# Patient Record
Sex: MALE | Race: WHITE | NOT HISPANIC OR LATINO | Employment: OTHER | URBAN - METROPOLITAN AREA
[De-identification: names, ages, dates, MRNs, and addresses within clinical notes are randomized per-mention and may not be internally consistent; named-entity substitution may affect disease eponyms.]

---

## 2017-04-27 ENCOUNTER — OFFICE VISIT (OUTPATIENT)
Dept: RADIATION ONCOLOGY | Facility: HOSPITAL | Age: 72
End: 2017-04-27

## 2017-04-27 ENCOUNTER — HOSPITAL ENCOUNTER (OUTPATIENT)
Dept: RADIATION ONCOLOGY | Facility: HOSPITAL | Age: 72
Setting detail: RADIATION/ONCOLOGY SERIES
Discharge: HOME | End: 2017-04-27

## 2017-04-27 VITALS
HEART RATE: 61 BPM | WEIGHT: 211.9 LBS | SYSTOLIC BLOOD PRESSURE: 114 MMHG | BODY MASS INDEX: 29.55 KG/M2 | OXYGEN SATURATION: 93 % | DIASTOLIC BLOOD PRESSURE: 70 MMHG | RESPIRATION RATE: 20 BRPM | TEMPERATURE: 97.5 F

## 2017-04-27 DIAGNOSIS — C61 PROSTATE CANCER (HCC): Primary | ICD-10-CM

## 2017-04-27 PROCEDURE — G0463 HOSPITAL OUTPT CLINIC VISIT: HCPCS

## 2017-04-27 RX ORDER — TAMSULOSIN HYDROCHLORIDE 0.4 MG/1
1 CAPSULE ORAL EVERY OTHER DAY
COMMUNITY

## 2017-04-27 NOTE — PROGRESS NOTES
FOLLOW UP NOTE    PATIENT:                                                      Yovany Eugene  MEDICAL RECORD #:                        [id number]  :                                                          1945  COMPLETION DATE:    16  DIAGNOSIS:     Prostate cancer    Staging form: Prostate, AJCC V7      Clinical stage from 2016: Stage I (T1c, N0, M0, PSA: Less than 10, Antwan <= 6) - Signed by Torin Zaragoza MD on 2016      BRIEF HISTORY:    Routine follow-up visit.  He status post CyberKnife SBRT for low risk prostate cancer.  Pretreatment PSA was 7.25.  Most recent follow-up PSA from 2017 the decreased to a value of 1.49.  He's tolerated treatment well with minimal symptoms and is back to baseline with no current urinary symptoms, bowel complaints, pain or fatigue.  He continues to use Flomax daily.    MEDICATIONS: Medication reconciliation for the patient was reviewed and confirmed in the electronic medical record.    Review of Systems   Constitutional: Negative.    HENT:  Negative.    Eyes: Negative.    Respiratory: Negative.    Cardiovascular: Negative.    Gastrointestinal: Negative.    Endocrine: Negative.    Genitourinary: Negative.     Musculoskeletal: Negative.    Skin: Negative.    Neurological: Negative.    Hematological: Bruises/bleeds easily.   Psychiatric/Behavioral: Negative.             IPSS Questionnaire (AUA-7):  Over the past month…    1)  Incomplete Emptying  How often have you had a sensation of not emptying your bladder?  2 - Less than half the time   2)  Frequency  How often have you had to urinate less than every two hours? 4 - More than half the time   3)  Intermittency  How often have you found you stopped and started again several times when you urinated?  2 - Less than half the time   4) Urgency  How often have you found it difficult to postpone urination?  3 - About half the time   5) Weak Stream  How often have you had a weak urinary stream?  1 -  Less than 1 time in 5   6) Straining  How often have you had to push or strain to begin urination?  0 - Not at all   7) Nocturia  How many times did you typically get up at night to urinate?  1 - 1 time   Total Score:  13       Quality of life due to urinary symptoms:  If you were to spend the rest of your life with your urinary condition the way it is now, how would you feel about that? 1-Pleased   Urine Leakage (Incontinence) 1-Mild (A few drops a day, no pad use)     Sexual Health Inventory  Current Status    1)  How do you rate your confidence that you could achieve and keep an erection? 2-Low   2) When you had erections with sexual stimulation, how often were your erections hard enough for penetration (entering your partner)? 3-Sometime (about half the time)   3)  During sexual intercourse, how often were you able to maintain your erection after you had penetrated (entered) into your partner? 4-Most times (much more than half the time)   4) During sexual intercourse, how difficult was it to maintain your erection to completion of intercourse? 3-Difficult   5) When you attempted sexual intercourse, how often was it satisfactory to you? 3-Sometime (about half the time)   Total Score: 15       Bowel Health Inventory  Current Status: 0-No problems, no rectal bleeding, no discharge, less then 5 bowel movements a day               KPS 90%    Physical Exam   Constitutional: He is oriented to person, place, and time. He appears well-developed and well-nourished.   HENT:   Head: Normocephalic and atraumatic.   Neck: Normal range of motion. Neck supple.   Cardiovascular: Normal rate, regular rhythm and normal heart sounds.    No murmur heard.  Pulmonary/Chest: Effort normal and breath sounds normal. He has no wheezes. He has no rales.   Abdominal: Soft. Bowel sounds are normal. He exhibits no distension. There is no hepatosplenomegaly. There is no tenderness.   Genitourinary: Prostate is not enlarged (flat, small, no  nodules palpable.  Seminal vesicles not palpable.) and not tender.   Musculoskeletal: Normal range of motion. He exhibits no edema or tenderness.   Lymphadenopathy:     He has no cervical adenopathy.     He has no axillary adenopathy.        Right: No supraclavicular adenopathy present.        Left: No supraclavicular adenopathy present.   Neurological: He is alert and oriented to person, place, and time. He has normal strength. No sensory deficit.   Skin: Skin is warm and dry.   Psychiatric: He has a normal mood and affect. His behavior is normal. Judgment and thought content normal.   Nursing note and vitals reviewed.      VITAL SIGNS:   Vitals:    04/27/17 0900   BP: 114/70   Pulse: 61   Resp: 20   Temp: 97.5 °F (36.4 °C)   TempSrc: Temporal Artery    SpO2: 93%   Weight: 211 lb 14.4 oz (96.1 kg)   PainSc: 0-No pain       The following portions of the patient's history were reviewed and updated as appropriate: allergies, current medications, past family history, past medical history, past social history, past surgical history and problem list.         Yovany was seen today for prostate cancer.    Diagnoses and all orders for this visit:    Prostate cancer       IMPRESSION:  Excellent early biochemical response 6 months after CyberKnife SBRT for prostate cancer.    RECOMMENDATIONS:  He will continue [facility]rologic surveillance [facility] the [facility].    He will try to wean off Flomax decreasing to every other day use and if he continues to well to stop the medication.    Return in about 1 year (around 4/27/2018) for Office Visit.    Torin Zaragoza MD    Errors in dictation may reflect use of voice recognition software and not all errors in transcription may have been detected prior to signing.

## 2018-04-23 ENCOUNTER — HOSPITAL ENCOUNTER (OUTPATIENT)
Dept: RADIATION ONCOLOGY | Facility: HOSPITAL | Age: 73
Setting detail: RADIATION/ONCOLOGY SERIES
Discharge: HOME | End: 2018-04-23

## 2018-04-23 ENCOUNTER — OFFICE VISIT (OUTPATIENT)
Dept: RADIATION ONCOLOGY | Facility: HOSPITAL | Age: 73
End: 2018-04-23

## 2018-04-23 VITALS
HEART RATE: 70 BPM | OXYGEN SATURATION: 96 % | TEMPERATURE: 97.3 F | WEIGHT: 216.8 LBS | SYSTOLIC BLOOD PRESSURE: 143 MMHG | DIASTOLIC BLOOD PRESSURE: 76 MMHG | RESPIRATION RATE: 18 BRPM | BODY MASS INDEX: 30.24 KG/M2

## 2018-04-23 DIAGNOSIS — C61 PROSTATE CANCER (HCC): ICD-10-CM

## 2018-04-23 PROCEDURE — G0463 HOSPITAL OUTPT CLINIC VISIT: HCPCS | Performed by: RADIOLOGY

## 2018-04-23 PROCEDURE — G0463 HOSPITAL OUTPT CLINIC VISIT: HCPCS

## 2018-04-23 NOTE — PROGRESS NOTES
FOLLOW UP NOTE    PATIENT:                                                      Yovany Eugene  MEDICAL RECORD #:                        [id number]  :                                                          1945  COMPLETION DATE:    2016  DIAGNOSIS:     Prostate cancer    Staging form: Prostate, AJCC V7    - Clinical stage from 2016: Stage I (T1c, N0, M0, PSA: Less than 10, [place] <= 6) - Signed by Torin Zaragoza MD on 2016      BRIEF HISTORY:    Annual follow-up visit.  He has a history of low risk prostate cancer status post CyberKnife SBRT.  He's currently voiding well and has almost completely tapered off use of alpha-blockers.  No complaints.  PSA last month at the [facility] was 0.399 ng/ml.    MEDICATIONS: Medication reconciliation for the patient was reviewed and confirmed in the electronic medical record.    Review of Systems   All other systems reviewed and are negative.        IPSS Questionnaire (AUA-7):  Over the past month…    1)  Incomplete Emptying  How often have you had a sensation of not emptying your bladder?  1 - Less than 1 time in 5   2)  Frequency  How often have you had to urinate less than every two hours? 1 - Less than 1 time in 5   3)  Intermittency  How often have you found you stopped and started again several times when you urinated?  1 - Less than 1 time in 5   4) Urgency  How often have you found it difficult to postpone urination?  1 - Less than 1 time in 5   5) Weak Stream  How often have you had a weak urinary stream?  0 - Not at all   6) Straining  How often have you had to push or strain to begin urination?  0 - Not at all   7) Nocturia  How many times did you typically get up at night to urinate?  1 - 1 time   Total Score:  5       Quality of life due to urinary symptoms:  If you were to spend the rest of your life with your urinary condition the way it is now, how would you feel about that? 1-Pleased   Urine Leakage (Incontinence) 1-Mild (A few  drops a day, no pad use)     Sexual Health Inventory  Current Status    1)  How do you rate your confidence that you could achieve and keep an erection? 2-Low   2) When you had erections with sexual stimulation, how often were your erections hard enough for penetration (entering your partner)? 2-A few times (much less than half the time)   3)  During sexual intercourse, how often were you able to maintain your erection after you had penetrated (entered) into your partner? 2-A few times (much less than half the time)   4) During sexual intercourse, how difficult was it to maintain your erection to completion of intercourse? 2-Very difficult   5) When you attempted sexual intercourse, how often was it satisfactory to you? 2-A few times (much less than half the time)   Total Score: 10       Bowel Health Inventory  Current Status: 0-No problems, no rectal bleeding, no discharge, less then 5 bowel movements a day           KPS 90%    Physical Exam   Constitutional: He is oriented to person, place, and time. He appears well-developed and well-nourished.   HENT:   Head: Normocephalic and atraumatic.   Neck: Normal range of motion. Neck supple.   Cardiovascular: Normal rate, regular rhythm and normal heart sounds.    No murmur heard.  Pulmonary/Chest: Effort normal and breath sounds normal. He has no wheezes. He has no rales.   Abdominal: Soft. Bowel sounds are normal. He exhibits no distension. There is no hepatosplenomegaly. There is no tenderness.   Genitourinary: Prostate is not enlarged and not tender.   Musculoskeletal: Normal range of motion. He exhibits no edema or tenderness.   Lymphadenopathy:     He has no cervical adenopathy.     He has no axillary adenopathy.        Right: No supraclavicular adenopathy present.        Left: No supraclavicular adenopathy present.   Neurological: He is alert and oriented to person, place, and time. He has normal strength. No sensory deficit.   Skin: Skin is warm and dry.    Psychiatric: He has a normal mood and affect. His behavior is normal. Judgment and thought content normal.   Nursing note and vitals reviewed.      VITAL SIGNS:   Vitals:    04/23/18 0945   BP: 143/76   Pulse: 70   Resp: 18   Temp: 97.3 °F (36.3 °C)   TempSrc: Temporal Artery    SpO2: 96%   Weight: 98.3 kg (216 lb 12.8 oz)   PainSc: 0-No pain       The following portions of the patient's history were reviewed and updated as appropriate: allergies, current medications, past family history, past medical history, past social history, past surgical history and problem list.         Yovany was seen today for prostate cancer.    Diagnoses and all orders for this visit:    Prostate cancer         IMPRESSION:  Prostate cancer, Antwan 3+3 = 6, clinical stage I (T1c, N0, M0) with pre-treatment PSA 7.25 ng/ml.  Jose Roberto PSA is 0.399 ng/ml, indicating excellent, complete biochemical remission.  He has tolerated treatment very well.  Prognosis is excellent.    RECOMMENDATIONS:  [facility]    Return if symptoms worsen or fail to improve.    Torin Zaragoza MD    Errors in dictation may reflect use of voice recognition software and not all errors in transcription may have been detected prior to signing.